# Patient Record
Sex: FEMALE | Race: BLACK OR AFRICAN AMERICAN | NOT HISPANIC OR LATINO | Employment: OTHER | URBAN - METROPOLITAN AREA
[De-identification: names, ages, dates, MRNs, and addresses within clinical notes are randomized per-mention and may not be internally consistent; named-entity substitution may affect disease eponyms.]

---

## 2022-02-28 ENCOUNTER — OFFICE VISIT (OUTPATIENT)
Dept: OPHTHALMOLOGY | Facility: CLINIC | Age: 62
End: 2022-02-28
Payer: MEDICAID

## 2022-02-28 DIAGNOSIS — H40.20X0 NARROW ANGLE GLAUCOMA OF RIGHT EYE: Primary | ICD-10-CM

## 2022-02-28 DIAGNOSIS — H40.20X0 NARROW ANGLE GLAUCOMA OF LEFT EYE: ICD-10-CM

## 2022-02-28 DIAGNOSIS — H25.11 NUCLEAR SCLEROSIS OF RIGHT EYE: ICD-10-CM

## 2022-02-28 PROCEDURE — 1159F MED LIST DOCD IN RCRD: CPT | Mod: CPTII,,, | Performed by: OPHTHALMOLOGY

## 2022-02-28 PROCEDURE — 99202 OFFICE O/P NEW SF 15 MIN: CPT | Mod: PBBFAC | Performed by: OPHTHALMOLOGY

## 2022-02-28 PROCEDURE — 1160F RVW MEDS BY RX/DR IN RCRD: CPT | Mod: CPTII,,, | Performed by: OPHTHALMOLOGY

## 2022-02-28 PROCEDURE — 1160F PR REVIEW ALL MEDS BY PRESCRIBER/CLIN PHARMACIST DOCUMENTED: ICD-10-PCS | Mod: CPTII,,, | Performed by: OPHTHALMOLOGY

## 2022-02-28 PROCEDURE — 1159F PR MEDICATION LIST DOCUMENTED IN MEDICAL RECORD: ICD-10-PCS | Mod: CPTII,,, | Performed by: OPHTHALMOLOGY

## 2022-02-28 PROCEDURE — 99999 PR PBB SHADOW E&M-NEW PATIENT-LVL II: CPT | Mod: PBBFAC,,, | Performed by: OPHTHALMOLOGY

## 2022-02-28 PROCEDURE — 99999 PR PBB SHADOW E&M-NEW PATIENT-LVL II: ICD-10-PCS | Mod: PBBFAC,,, | Performed by: OPHTHALMOLOGY

## 2022-02-28 PROCEDURE — 92004 COMPRE OPH EXAM NEW PT 1/>: CPT | Mod: S$PBB,,, | Performed by: OPHTHALMOLOGY

## 2022-02-28 PROCEDURE — 92020 GONIOSCOPY: CPT | Mod: PBBFAC | Performed by: OPHTHALMOLOGY

## 2022-02-28 PROCEDURE — 92020 GONIOSCOPY: CPT | Mod: S$PBB,,, | Performed by: OPHTHALMOLOGY

## 2022-02-28 PROCEDURE — 92020 PR SPECIAL EYE EVAL,GONIOSCOPY: ICD-10-PCS | Mod: S$PBB,,, | Performed by: OPHTHALMOLOGY

## 2022-02-28 PROCEDURE — 92004 PR EYE EXAM, NEW PATIENT,COMPREHESV: ICD-10-PCS | Mod: S$PBB,,, | Performed by: OPHTHALMOLOGY

## 2022-02-28 RX ORDER — BRIMONIDINE TARTRATE AND TIMOLOL MALEATE 2; 5 MG/ML; MG/ML
1 SOLUTION OPHTHALMIC 2 TIMES DAILY
Qty: 5 ML | Refills: 6 | Status: SHIPPED | OUTPATIENT
Start: 2022-02-28 | End: 2022-06-27 | Stop reason: SDUPTHER

## 2022-02-28 NOTE — PROGRESS NOTES
Subjective:       Patient ID: Yane Rodriguez is a 62 y.o. female.    Chief Complaint: Concerns About Ocular Health, Cataract, and Eye Problem    HPI     Pt here for cataract evaluation   Pt had Glaucoma sx OS about a year ago   Pain OS ever since her SX on and off   Pt has very blurred VA  No gtts    Last edited by Dorie Adrian on 2/28/2022  8:23 AM. (History)             Assessment:       1. Narrow angle glaucoma of right eye    2. Narrow angle glaucoma of left eye    3. Nuclear sclerosis of right eye        Plan:       Narrow angle glaucoma OD-Pt with elevated IOP & ON cupping. Pt not on any gtts at this time. IOP lowered OU today with Combigan & Azopt x 3.  Narrow angle glaucoma OS s/p trab per Dr Collins last yr-Pt gives a h/o possible angle closure episode with ? Laser PI x 2. Pt is s/p failed trab with elevated IOP & post synechiae (no view of lens & posterior segment).  Cataract OD- Not visually significant.         Start Combigan bid OU.  Refer to Dr Turcios for eval/tx OU.

## 2022-03-04 ENCOUNTER — TELEPHONE (OUTPATIENT)
Dept: OPHTHALMOLOGY | Facility: CLINIC | Age: 62
End: 2022-03-04
Payer: MEDICAID

## 2022-03-04 ENCOUNTER — OFFICE VISIT (OUTPATIENT)
Dept: OPHTHALMOLOGY | Facility: CLINIC | Age: 62
End: 2022-03-04
Payer: MEDICAID

## 2022-03-04 DIAGNOSIS — H40.2224 CHRONIC ANGLE-CLOSURE GLAUCOMA, INDETERMINATE STAGE, LEFT: Primary | ICD-10-CM

## 2022-03-04 DIAGNOSIS — H40.52X4: ICD-10-CM

## 2022-03-04 DIAGNOSIS — H40.1114 PRIMARY OPEN ANGLE GLAUCOMA (POAG) OF RIGHT EYE, INDETERMINATE STAGE: ICD-10-CM

## 2022-03-04 DIAGNOSIS — H21.522 PAS (PERIPH ANTERIOR SYNECHIAE), LEFT: ICD-10-CM

## 2022-03-04 DIAGNOSIS — H18.422 BAND KERATOPATHY OF LEFT EYE: ICD-10-CM

## 2022-03-04 DIAGNOSIS — H40.031 ANATOMICAL NARROW ANGLE, RIGHT EYE: ICD-10-CM

## 2022-03-04 DIAGNOSIS — H21.42: ICD-10-CM

## 2022-03-04 DIAGNOSIS — H20.12 CHRONIC IRITIS, LEFT EYE: ICD-10-CM

## 2022-03-04 PROCEDURE — 92014 COMPRE OPH EXAM EST PT 1/>: CPT | Mod: S$PBB,,, | Performed by: OPHTHALMOLOGY

## 2022-03-04 PROCEDURE — 76512 OPH US DX B-SCAN: CPT | Mod: 50,PBBFAC | Performed by: OPHTHALMOLOGY

## 2022-03-04 PROCEDURE — 99211 OFF/OP EST MAY X REQ PHY/QHP: CPT | Mod: PBBFAC | Performed by: OPHTHALMOLOGY

## 2022-03-04 PROCEDURE — 92020 GONIOSCOPY: CPT | Mod: PBBFAC | Performed by: OPHTHALMOLOGY

## 2022-03-04 PROCEDURE — 99999 PR PBB SHADOW E&M-EST. PATIENT-LVL I: CPT | Mod: PBBFAC,,, | Performed by: OPHTHALMOLOGY

## 2022-03-04 PROCEDURE — 92020 PR SPECIAL EYE EVAL,GONIOSCOPY: ICD-10-PCS | Mod: S$PBB,,, | Performed by: OPHTHALMOLOGY

## 2022-03-04 PROCEDURE — 92014 PR EYE EXAM, EST PATIENT,COMPREHESV: ICD-10-PCS | Mod: S$PBB,,, | Performed by: OPHTHALMOLOGY

## 2022-03-04 PROCEDURE — 99999 PR PBB SHADOW E&M-EST. PATIENT-LVL I: ICD-10-PCS | Mod: PBBFAC,,, | Performed by: OPHTHALMOLOGY

## 2022-03-04 PROCEDURE — 92020 GONIOSCOPY: CPT | Mod: S$PBB,,, | Performed by: OPHTHALMOLOGY

## 2022-03-04 PROCEDURE — 76512 B SCAN: ICD-10-PCS | Mod: 26,50,S$PBB, | Performed by: OPHTHALMOLOGY

## 2022-03-04 RX ORDER — DORZOLAMIDE HCL 20 MG/ML
1 SOLUTION/ DROPS OPHTHALMIC 3 TIMES DAILY
Qty: 10 ML | Refills: 6 | Status: SHIPPED | OUTPATIENT
Start: 2022-03-04 | End: 2022-06-27 | Stop reason: SDUPTHER

## 2022-03-04 NOTE — PROGRESS NOTES
HPI     DLS: 2/28/2022 With Dr. Carrillo    Pt here for High IOP Check per Dr. Carrillo;    H/O iritis and glaucoma OS    S/P several laser treatments and a trabeculectomy os feb 2021 - Dr Collins      Pt states that she did NOT have cataract surgery at same time     Meds;   Combigan BID OU - recently started with zackary - 2/28/2022     Last edited by Nona Turcios MD on 3/4/2022  9:51 AM. (History)            Assessment /Plan     For exam results, see Encounter Report.    Chronic angle-closure glaucoma, indeterminate stage, left  -     dorzolamide (TRUSOPT) 2 % ophthalmic solution; Place 1 drop into both eyes 3 (three) times daily.  Dispense: 10 mL; Refill: 6  -     Color Fundus Photography - OU - Both Eyes  -     Posterior Segment OCT Optic Nerve- Both eyes; Future  -     [place] Visual Field - OU - Extended - Both Eyes; Future    Primary open angle glaucoma (POAG) of right eye, indeterminate stage  -     dorzolamide (TRUSOPT) 2 % ophthalmic solution; Place 1 drop into both eyes 3 (three) times daily.  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 Laser PI's) followed by trab os - Dr Collins - feb 2021   Pt states she ws discharged fro clinic - but was to call when she wanted to schedule cataract surgery   Was seen by dr Wilkinson and referred over     2. POAG od    slightly narrow angle    CDR 0.8 w/ inf notch    T base 38/34 - off gtts    IOP better on combigan ou - 38/34--> 23/21   Photos - OD - 3/4/2022   + FmHx - mother     3. NS  od    NVS     4. Secluded pupil - no view at all    No H/O lens extraction - so lens is likely behind the secluded pupil    Cornea is so cloudy - that catarct surgery would be difficult or impossible     5. Chronic iritis os    Lots of KP's on the cornea   ?? If it was a uveitic glaucoma as initial problem     PLAN   B scan os - no obvious RD     ON photos od   Add dorzolamdie tid ou   Cont combigan   Schedule HVF od and OCT   Consider steroid gtts os   ? What the best approach to possibly re-habilitating the left eye might be - anterior - but cornea is VERY cloudy - or PPVx / lensectomy - but again not sure the view would be adequate     Needs glaucoma evaluation OD and try to stabilize the right eye    May benefit from laser PI in future OD     Try and get some records from Dr Vela office - that may shed some light on initial cause of glaucoma and on possible visual potential os - release signed

## 2022-03-15 ENCOUNTER — OFFICE VISIT (OUTPATIENT)
Dept: OPHTHALMOLOGY | Facility: CLINIC | Age: 62
End: 2022-03-15
Payer: MEDICAID

## 2022-03-15 ENCOUNTER — CLINICAL SUPPORT (OUTPATIENT)
Dept: OPHTHALMOLOGY | Facility: CLINIC | Age: 62
End: 2022-03-15
Payer: MEDICAID

## 2022-03-15 DIAGNOSIS — H40.1112 PRIMARY OPEN ANGLE GLAUCOMA (POAG) OF RIGHT EYE, MODERATE STAGE: ICD-10-CM

## 2022-03-15 DIAGNOSIS — H18.422 BAND KERATOPATHY OF LEFT EYE: ICD-10-CM

## 2022-03-15 DIAGNOSIS — H40.1114 PRIMARY OPEN ANGLE GLAUCOMA (POAG) OF RIGHT EYE, INDETERMINATE STAGE: ICD-10-CM

## 2022-03-15 DIAGNOSIS — H40.2224 CHRONIC ANGLE-CLOSURE GLAUCOMA, INDETERMINATE STAGE, LEFT: Primary | ICD-10-CM

## 2022-03-15 DIAGNOSIS — H40.2224 CHRONIC ANGLE-CLOSURE GLAUCOMA, INDETERMINATE STAGE, LEFT: ICD-10-CM

## 2022-03-15 DIAGNOSIS — H40.031 ANATOMICAL NARROW ANGLE, RIGHT EYE: ICD-10-CM

## 2022-03-15 DIAGNOSIS — H25.11 NUCLEAR SCLEROSIS OF RIGHT EYE: ICD-10-CM

## 2022-03-15 DIAGNOSIS — H21.522 PAS (PERIPH ANTERIOR SYNECHIAE), LEFT: ICD-10-CM

## 2022-03-15 DIAGNOSIS — H20.12 CHRONIC IRITIS, LEFT EYE: ICD-10-CM

## 2022-03-15 PROCEDURE — 92133 POSTERIOR SEGMENT OCT OPTIC NERVE(OCULAR COHERENCE TOMOGRAPHY) - OU - BOTH EYES: ICD-10-PCS | Mod: 26,S$PBB,, | Performed by: OPHTHALMOLOGY

## 2022-03-15 PROCEDURE — 99212 OFFICE O/P EST SF 10 MIN: CPT | Mod: PBBFAC | Performed by: OPHTHALMOLOGY

## 2022-03-15 PROCEDURE — 99999 PR PBB SHADOW E&M-EST. PATIENT-LVL II: CPT | Mod: PBBFAC,,, | Performed by: OPHTHALMOLOGY

## 2022-03-15 PROCEDURE — 1159F PR MEDICATION LIST DOCUMENTED IN MEDICAL RECORD: ICD-10-PCS | Mod: CPTII,,, | Performed by: OPHTHALMOLOGY

## 2022-03-15 PROCEDURE — 92012 PR EYE EXAM, EST PATIENT,INTERMED: ICD-10-PCS | Mod: S$PBB,,, | Performed by: OPHTHALMOLOGY

## 2022-03-15 PROCEDURE — 99999 PR PBB SHADOW E&M-EST. PATIENT-LVL II: ICD-10-PCS | Mod: PBBFAC,,, | Performed by: OPHTHALMOLOGY

## 2022-03-15 PROCEDURE — 92133 CPTRZD OPH DX IMG PST SGM ON: CPT | Mod: PBBFAC | Performed by: OPHTHALMOLOGY

## 2022-03-15 PROCEDURE — 1159F MED LIST DOCD IN RCRD: CPT | Mod: CPTII,,, | Performed by: OPHTHALMOLOGY

## 2022-03-15 PROCEDURE — 92012 INTRM OPH EXAM EST PATIENT: CPT | Mod: S$PBB,,, | Performed by: OPHTHALMOLOGY

## 2022-03-15 PROCEDURE — 1160F RVW MEDS BY RX/DR IN RCRD: CPT | Mod: CPTII,,, | Performed by: OPHTHALMOLOGY

## 2022-03-15 PROCEDURE — 1160F PR REVIEW ALL MEDS BY PRESCRIBER/CLIN PHARMACIST DOCUMENTED: ICD-10-PCS | Mod: CPTII,,, | Performed by: OPHTHALMOLOGY

## 2022-03-15 RX ORDER — LATANOPROST 50 UG/ML
1 SOLUTION/ DROPS OPHTHALMIC NIGHTLY
Qty: 2.5 ML | Refills: 12 | Status: SHIPPED | OUTPATIENT
Start: 2022-03-15 | End: 2023-02-27 | Stop reason: SDUPTHER

## 2022-03-15 RX ORDER — LEVOTHYROXINE SODIUM 125 UG/1
125 TABLET ORAL DAILY
COMMUNITY
Start: 2022-02-24

## 2022-03-15 NOTE — PROGRESS NOTES
HPI     Glaucoma      Additional comments: HVF and OCT review and pt states no changes since   last exam              Comments     DLS: 3/4/22    1. CACG OS  2. POAG OD  3. Secluded Pupil OS  4. Chronic Iritis OS  5. NS OD    MEDS:  Combigan BID OU  Dorzolamide TID OU          Last edited by Carlene Vaughn MA on 3/15/2022  3:34 PM. (History)            Assessment /Plan     For exam results, see Encounter Report.    Chronic angle-closure glaucoma, indeterminate stage, left    Primary open angle glaucoma (POAG) of right eye, moderate stage    Anatomical narrow angle, right eye    Pas (periph anterior synechiae), left    Band keratopathy of left eye    Chronic iritis, left eye    Nuclear sclerosis of right eye        1. 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 Laser PI's) followed by trab os - Dr Collins - feb 2021   Pt states she ws discharged from clinic - but was to call when she wanted to schedule cataract surgery   Was seen by dr Wilkinson and referred over     2. POAG od    + FmHx - mother    slightly narrow angle    CDR 0.8 w/ inf notch    T base 38/34 - off gtts    IOP better on combigan BID ou and dorzolamide TID OU - 38/34--> 23/21 -->24/16   Photos - OD - 3/4/2022   + FmHx - mother    OCT - RNFL od 0 dec IT (3/2022)   HVF - 24-2 ss od - SAD w/ 1 out of 4 fix points affected  // INS (3/2022)     3. NS od    NVS     4. Secluded pupil - no view at all    No H/O lens extraction - so lens is likely behind the secluded pupil    Cornea is so cloudy - that catarct surgery would be difficult or impossible     5. Chronic iritis os    Lots of KP's on the cornea   ?? If it was a uveitic glaucoma as initial problem     PLAN   B scan os - no obvious RD   ON photos od   Cont  dorzolamdie tid ou   Cont combigan ou   Schedule HVF od and OCT   Consider steroid gtts os   ? What the best approach to possibly re-habilitating the left eye might be - anterior - but cornea is VERY  cloudy - or PPVx / lensectomy - but again not sure the view would be adequate     Needs glaucoma evaluation OD and try to stabilize the right eye    May benefit from laser PI in future OD - but doubt occludedable  at this time - and this may be how the problems in the left eye started     ?? Consider a uveitic / iritis work up     Attempt to  get some records from Dr Vela office - that may shed some light on initial cause of glaucoma and on possible visual potential os - release signed 3/4/22

## 2022-04-26 ENCOUNTER — OFFICE VISIT (OUTPATIENT)
Dept: OPHTHALMOLOGY | Facility: CLINIC | Age: 62
End: 2022-04-26
Payer: MEDICAID

## 2022-04-26 DIAGNOSIS — H40.1112 PRIMARY OPEN ANGLE GLAUCOMA (POAG) OF RIGHT EYE, MODERATE STAGE: ICD-10-CM

## 2022-04-26 DIAGNOSIS — H18.422 BAND KERATOPATHY OF LEFT EYE: ICD-10-CM

## 2022-04-26 DIAGNOSIS — H21.42: ICD-10-CM

## 2022-04-26 DIAGNOSIS — H20.12 CHRONIC IRITIS, LEFT EYE: ICD-10-CM

## 2022-04-26 DIAGNOSIS — H40.031 ANATOMICAL NARROW ANGLE, RIGHT EYE: ICD-10-CM

## 2022-04-26 DIAGNOSIS — H40.52X4: ICD-10-CM

## 2022-04-26 DIAGNOSIS — H21.522 PAS (PERIPH ANTERIOR SYNECHIAE), LEFT: ICD-10-CM

## 2022-04-26 DIAGNOSIS — H25.11 NUCLEAR SCLEROSIS OF RIGHT EYE: ICD-10-CM

## 2022-04-26 DIAGNOSIS — H40.2224 CHRONIC ANGLE-CLOSURE GLAUCOMA, INDETERMINATE STAGE, LEFT: Primary | ICD-10-CM

## 2022-04-26 PROCEDURE — 1160F RVW MEDS BY RX/DR IN RCRD: CPT | Mod: CPTII,,, | Performed by: OPHTHALMOLOGY

## 2022-04-26 PROCEDURE — 99999 PR PBB SHADOW E&M-EST. PATIENT-LVL II: ICD-10-PCS | Mod: PBBFAC,,, | Performed by: OPHTHALMOLOGY

## 2022-04-26 PROCEDURE — 1159F MED LIST DOCD IN RCRD: CPT | Mod: CPTII,,, | Performed by: OPHTHALMOLOGY

## 2022-04-26 PROCEDURE — 1160F PR REVIEW ALL MEDS BY PRESCRIBER/CLIN PHARMACIST DOCUMENTED: ICD-10-PCS | Mod: CPTII,,, | Performed by: OPHTHALMOLOGY

## 2022-04-26 PROCEDURE — 92012 INTRM OPH EXAM EST PATIENT: CPT | Mod: S$PBB,,, | Performed by: OPHTHALMOLOGY

## 2022-04-26 PROCEDURE — 99212 OFFICE O/P EST SF 10 MIN: CPT | Mod: PBBFAC | Performed by: OPHTHALMOLOGY

## 2022-04-26 PROCEDURE — 92012 PR EYE EXAM, EST PATIENT,INTERMED: ICD-10-PCS | Mod: S$PBB,,, | Performed by: OPHTHALMOLOGY

## 2022-04-26 PROCEDURE — 99999 PR PBB SHADOW E&M-EST. PATIENT-LVL II: CPT | Mod: PBBFAC,,, | Performed by: OPHTHALMOLOGY

## 2022-04-26 PROCEDURE — 1159F PR MEDICATION LIST DOCUMENTED IN MEDICAL RECORD: ICD-10-PCS | Mod: CPTII,,, | Performed by: OPHTHALMOLOGY

## 2022-04-26 NOTE — PROGRESS NOTES
HPI     DLS: 3/15/2022    Pt here for IOP Check;  Pt states every now and then she would get a headache.     Meds;  Combigan BID OU   Dorzolamide TID OU  Latanoprost QHS OD ONLY    1. CACG OS   2. POAG OD   3. Secluded Pupil OS   4. Chronic Iritis OS   5. NS OD     Last edited by Nancy Daniel on 4/26/2022  1:44 PM. (History)            Assessment /Plan     For exam results, see Encounter Report.    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 Laser PI's) followed by trab os - Dr Collins - feb 2021   Pt states she ws discharged from clinic - but was to call when she wanted to schedule cataract surgery   Was seen by dr Wilkinson and referred over     2. POAG od    + FmHx - mother    slightly narrow angle - monitor   CDR 0.8 w/ inf notch    T base 38/34 - off gtts    IOP better on combigan BID ou and dorzolamide TID OU - 38/34--> 23/21 -->24/16   Photos - OD - 3/4/2022   + FmHx - mother    OCT - RNFL od 0 dec IT (3/2022)   HVF - 24-2 ss od - SAD w/ 1 out of 4 fix points affected  // INS (3/2022)         Ttoday  16/15      Tests today IOP check    3. NS od    NVS     4. Secluded pupil - no view at all    No H/O lens extraction - so lens is likely behind the secluded pupil    Cornea is so cloudy - that catarct surgery would be difficult or impossible     5. Chronic iritis os    Lots of KP's on the cornea   ?? If it was a uveitic glaucoma as initial problem     PLAN   B scan os - no obvious RD   ON photos od   Cont  dorzolamdie tid ou   Cont combigan ou   Cont latanoprost qhs OD  Schedule HVF od and OCT    Consider steroid gtts os   ? What the best approach to possibly re-habilitating the left eye might be - anterior - but cornea is VERY cloudy - or PPVx / lensectomy - but again not sure the view would be adequate     Needs glaucoma evaluation OD and try to stabilize the right eye    May benefit from laser PI in future OD - but doubt occludedable  at this time - and this may be how the problems in the left eye started     ?? Consider a uveitic / iritis work up     Received records from Dr Vela office -  shed some light on initial cause of glaucoma and on possible visual potential os - release signed 3/4/22    4/26/2022  IOP good od // vision good od   CSM    Do not feel that any surgical intervention of the left eye is likely to help    F/U 2 months - if still ok can spread visits out to 3 months

## 2022-06-27 ENCOUNTER — OFFICE VISIT (OUTPATIENT)
Dept: OPHTHALMOLOGY | Facility: CLINIC | Age: 62
End: 2022-06-27
Payer: MEDICAID

## 2022-06-27 DIAGNOSIS — H21.522 PAS (PERIPH ANTERIOR SYNECHIAE), LEFT: ICD-10-CM

## 2022-06-27 DIAGNOSIS — H40.031 ANATOMICAL NARROW ANGLE, RIGHT EYE: ICD-10-CM

## 2022-06-27 DIAGNOSIS — H40.2224 CHRONIC ANGLE-CLOSURE GLAUCOMA, INDETERMINATE STAGE, LEFT: Primary | ICD-10-CM

## 2022-06-27 DIAGNOSIS — H40.52X4: ICD-10-CM

## 2022-06-27 DIAGNOSIS — H40.1114 PRIMARY OPEN ANGLE GLAUCOMA (POAG) OF RIGHT EYE, INDETERMINATE STAGE: ICD-10-CM

## 2022-06-27 DIAGNOSIS — H21.42: ICD-10-CM

## 2022-06-27 DIAGNOSIS — H20.12 CHRONIC IRITIS, LEFT EYE: ICD-10-CM

## 2022-06-27 DIAGNOSIS — H40.1112 PRIMARY OPEN ANGLE GLAUCOMA (POAG) OF RIGHT EYE, MODERATE STAGE: ICD-10-CM

## 2022-06-27 DIAGNOSIS — H25.11 NUCLEAR SCLEROSIS OF RIGHT EYE: ICD-10-CM

## 2022-06-27 DIAGNOSIS — H18.422 BAND KERATOPATHY OF LEFT EYE: ICD-10-CM

## 2022-06-27 PROCEDURE — 1159F PR MEDICATION LIST DOCUMENTED IN MEDICAL RECORD: ICD-10-PCS | Mod: CPTII,,, | Performed by: OPHTHALMOLOGY

## 2022-06-27 PROCEDURE — 1159F MED LIST DOCD IN RCRD: CPT | Mod: CPTII,,, | Performed by: OPHTHALMOLOGY

## 2022-06-27 PROCEDURE — 1160F PR REVIEW ALL MEDS BY PRESCRIBER/CLIN PHARMACIST DOCUMENTED: ICD-10-PCS | Mod: CPTII,,, | Performed by: OPHTHALMOLOGY

## 2022-06-27 PROCEDURE — 99212 OFFICE O/P EST SF 10 MIN: CPT | Mod: PBBFAC | Performed by: OPHTHALMOLOGY

## 2022-06-27 PROCEDURE — 1160F RVW MEDS BY RX/DR IN RCRD: CPT | Mod: CPTII,,, | Performed by: OPHTHALMOLOGY

## 2022-06-27 PROCEDURE — 99999 PR PBB SHADOW E&M-EST. PATIENT-LVL II: ICD-10-PCS | Mod: PBBFAC,,, | Performed by: OPHTHALMOLOGY

## 2022-06-27 PROCEDURE — 92012 PR EYE EXAM, EST PATIENT,INTERMED: ICD-10-PCS | Mod: S$PBB,,, | Performed by: OPHTHALMOLOGY

## 2022-06-27 PROCEDURE — 99999 PR PBB SHADOW E&M-EST. PATIENT-LVL II: CPT | Mod: PBBFAC,,, | Performed by: OPHTHALMOLOGY

## 2022-06-27 PROCEDURE — 92012 INTRM OPH EXAM EST PATIENT: CPT | Mod: S$PBB,,, | Performed by: OPHTHALMOLOGY

## 2022-06-27 RX ORDER — DORZOLAMIDE HCL 20 MG/ML
1 SOLUTION/ DROPS OPHTHALMIC 3 TIMES DAILY
Qty: 10 ML | Refills: 12 | Status: SHIPPED | OUTPATIENT
Start: 2022-06-27 | End: 2023-02-27 | Stop reason: SDUPTHER

## 2022-06-27 RX ORDER — BRIMONIDINE TARTRATE AND TIMOLOL MALEATE 2; 5 MG/ML; MG/ML
1 SOLUTION OPHTHALMIC 2 TIMES DAILY
Qty: 5 ML | Refills: 12 | Status: SHIPPED | OUTPATIENT
Start: 2022-06-27 | End: 2023-02-27 | Stop reason: SDUPTHER

## 2022-06-27 NOTE — PROGRESS NOTES
HPI     DLS: 4/26/2022    Pt here for 2 Month Check;  Pt states she's been having some discomfort to her OS keeps constantly   tearing.     Meds:  Combigan BID OU   Dorzolamide TID OU   Latanoprost QHS OD ONLY     1. CACG OS   2. POAG OD   3. Secluded Pupil OS   4. Chronic Iritis OS   5. NS OD     Last edited by Nancy Daniel on 6/27/2022 10:21 AM. (History)            Assessment /Plan     For exam results, see Encounter Report.    Chronic angle-closure glaucoma, indeterminate stage, left  -     dorzolamide (TRUSOPT) 2 % ophthalmic solution; Place 1 drop into both eyes 3 (three) times daily.  Dispense: 10 mL; Refill: 12  -     brimonidine-timoloL (COMBIGAN) 0.2-0.5 % Drop; Place 1 drop into both eyes 2 (two) times a day.  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  Dispense: 10 mL; Refill: 12  -     brimonidine-timoloL (COMBIGAN) 0.2-0.5 % Drop; Place 1 drop into both eyes 2 (two) times a day.  Dispense: 5 mL; Refill: 12        1. 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 Laser PI's) followed by trab os - Dr Collins - feb 2021   Pt states she ws discharged from clinic - but was to call when she wanted to schedule cataract surgery   Was seen by dr Wilkinson and referred over     2. POAG od    + FmHx - mother    slightly narrow angle - monitor   CDR 0.8 w/ inf notch    T base 38/34 - off gtts    IOP better on combigan BID ou and dorzolamide TID OU - 38/34--> 23/21 -->24/16   Photos - OD - 3/4/2022   + FmHx -  mother    OCT - RNFL od 0 dec IT (3/2022)   HVF - 24-2 ss od - SAD w/ 1 out of 4 fix points affected  // INS (3/2022)         Ttoday  16/15      Tests today IOP check     3. NS od    NVS     4. Secluded pupil - no view at all    No H/O lens extraction - so lens is likely behind the secluded pupil    Cornea is so cloudy - that catarct surgery would be difficult or impossible     5. Chronic iritis os    Lots of KP's on the cornea   ?? 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 ?? APD os ( on reverse testing)     PLAN   B scan os - no obvious RD   ON photos od   Cont  dorzolamdie tid ou   Cont combigan ou   Cont latanoprost qhs OD    Needs glaucoma evaluation OD and try to stabilize the right eye    May benefit from laser PI in future OD - but doubt occludedable  at this time - and this may be how the problems in the left eye started     ?? Consider a uveitic / iritis work up     Received records from Dr Vela office -  shed some light on initial cause of glaucoma and on possible visual potential os -    4/26/2022  IOP good od // vision good od   CSM    Do not feel that any surgical intervention of the left eye is likely to help - but if does want to try would likely need a PKP , a reconstruction/re-creation of the pupil, CE and PC IOL with a cornea specialist - but the ON is likely very damaged - H/O elevated IOP for a long time and with 360 angle closure - ?? Refer to Dr hair prn // IOP is ok on gtts for now   Pt will think about it - for now not interested in seeing cornea doctor.     F/U 3 months - IOP check // gonio / - monitor for any iritis - may need laser PI od - will need close post laser  monitoring

## 2022-06-28 ENCOUNTER — TELEPHONE (OUTPATIENT)
Dept: OPHTHALMOLOGY | Facility: CLINIC | Age: 62
End: 2022-06-28
Payer: MEDICAID

## 2022-06-28 NOTE — TELEPHONE ENCOUNTER
KENNEDY CORBIN   Key: [id number] - PA Case ID: [id number]   Rx #: [id number]    PA  submitted for Combigan to [facility] via [facility]

## 2022-08-18 ENCOUNTER — HOSPITAL ENCOUNTER (EMERGENCY)
Facility: HOSPITAL | Age: 62
Discharge: HOME | End: 2022-08-18
Attending: EMERGENCY MEDICINE
Payer: MEDICAID

## 2022-08-18 VITALS
OXYGEN SATURATION: 99 % | BODY MASS INDEX: 45.08 KG/M2 | TEMPERATURE: 99 F | HEIGHT: 62 IN | HEART RATE: 75 BPM | DIASTOLIC BLOOD PRESSURE: 95 MMHG | WEIGHT: 245 LBS | RESPIRATION RATE: 18 BRPM | SYSTOLIC BLOOD PRESSURE: 193 MMHG

## 2022-08-18 DIAGNOSIS — K02.9 DENTAL CARIES: Primary | ICD-10-CM

## 2022-08-18 PROCEDURE — 25000003 PHARM REV CODE 250: Mod: ER | Performed by: EMERGENCY MEDICINE

## 2022-08-18 PROCEDURE — 99284 EMERGENCY DEPT VISIT MOD MDM: CPT | Mod: ER

## 2022-08-18 RX ORDER — TRAMADOL HYDROCHLORIDE 50 MG/1
50 TABLET ORAL EVERY 6 HOURS PRN
Qty: 12 TABLET | Refills: 0 | Status: SHIPPED | OUTPATIENT
Start: 2022-08-18 | End: 2022-08-21

## 2022-08-18 RX ORDER — TRAMADOL HYDROCHLORIDE 50 MG/1
50 TABLET ORAL
Status: COMPLETED | OUTPATIENT
Start: 2022-08-18 | End: 2022-08-18

## 2022-08-18 RX ORDER — CLINDAMYCIN HYDROCHLORIDE 150 MG/1
300 CAPSULE ORAL 4 TIMES DAILY
Qty: 56 CAPSULE | Refills: 0 | Status: SHIPPED | OUTPATIENT
Start: 2022-08-18 | End: 2022-08-25

## 2022-08-18 RX ORDER — CLINDAMYCIN HYDROCHLORIDE 150 MG/1
300 CAPSULE ORAL
Status: COMPLETED | OUTPATIENT
Start: 2022-08-18 | End: 2022-08-18

## 2022-08-18 RX ADMIN — TRAMADOL HYDROCHLORIDE 50 MG: 50 TABLET, COATED ORAL at 08:08

## 2022-08-18 RX ADMIN — CLINDAMYCIN HYDROCHLORIDE 300 MG: 150 CAPSULE ORAL at 08:08

## 2022-08-19 NOTE — ED PROVIDER NOTES
Encounter Date: 8/18/2022       History     Chief Complaint   Patient presents with    Dental Pain     Pain to right side of mouth for a few days      Patient presents with a chief complaint of dental pain.  Onset noted over the past few days.  This is localized to the upper R jaw.  Patient denies trismus.  Patient denies fever and swelling.          Review of patient's allergies indicates:  No Known Allergies  Past Medical History:   Diagnosis Date    Cataract     Glaucoma      Past Surgical History:   Procedure Laterality Date    TRABECULECTOMY Left 02/2021         Family History   Problem Relation Age of Onset    Glaucoma Mother     Amblyopia Neg Hx     Blindness Neg Hx     Cataracts Neg Hx     Diabetes Neg Hx     Macular degeneration Neg Hx     Retinal detachment Neg Hx     Strabismus Neg Hx      Social History     Tobacco Use    Smoking status: Never Smoker    Smokeless tobacco: Never Used   Substance Use Topics    Alcohol use: Not Currently    Drug use: Not Currently     Review of Systems   Constitutional: Negative for chills and fever.   HENT: Positive for dental problem. Negative for congestion and rhinorrhea.    Respiratory: Negative for cough and shortness of breath.    Cardiovascular: Negative for chest pain and palpitations.   Gastrointestinal: Negative for abdominal pain, diarrhea and vomiting.   Genitourinary: Negative for dysuria.   Skin: Negative for color change and rash.   Allergic/Immunologic: Negative for immunocompromised state.   Neurological: Negative for dizziness and light-headedness.   Hematological: Negative for adenopathy. Does not bruise/bleed easily.       Physical Exam     Initial Vitals [08/18/22 1927]   BP Pulse Resp Temp SpO2   (!) 193/95 75 16 98.9 °F (37.2 °C) 99 %      MAP       --         Physical Exam    Nursing note and vitals reviewed.  Constitutional: She appears well-developed and well-nourished. She is not diaphoretic. No distress.   HENT:   Head:  Normocephalic and atraumatic.   Mouth/Throat: Uvula is midline, oropharynx is clear and moist and mucous membranes are normal. Dental caries present.       Cardiovascular: Normal rate, regular rhythm, normal heart sounds and intact distal pulses.   Pulmonary/Chest: Breath sounds normal. No respiratory distress.     Neurological: She is alert and oriented to person, place, and time.   Skin: Skin is warm and dry.         ED Course   Procedures  Labs Reviewed - No data to display       Imaging Results    None          Medications   traMADoL tablet 50 mg (50 mg Oral Given 8/18/22 2005)   clindamycin capsule 300 mg (300 mg Oral Given 8/18/22 2005)     Medical Decision Making:   ED Management:  All findings were reviewed with the patient/family in detail.  I see no indication of an emergent process beyond that addressed during our encounter but have duly counseled the patient/family regarding the need for prompt follow-up as well as the indications that should prompt immediate return to the emergency room should new or worrisome developments occur.  The patient has additionally been provided with printed information regarding diagnosis as well as instructions regarding follow up and any medications intended to manage the patient's aforementioned conditions.  The patient/family communicates understanding of all this information and all remaining questions and concerns were addressed at this time.                        Clinical Impression:   Final diagnoses:  [K02.9] Dental caries (Primary)          ED Disposition Condition    Discharge Stable        ED Prescriptions     Medication Sig Dispense Start Date End Date Auth. Provider    clindamycin (CLEOCIN) 150 MG capsule Take 2 capsules (300 mg total) by mouth 4 (four) times daily. for 7 days 56 capsule 8/18/2022 8/25/2022 Madi Rocha MD    traMADoL (ULTRAM) 50 mg tablet Take 1 tablet (50 mg total) by mouth every 6 (six) hours as needed for Pain. 12 tablet 8/18/2022  8/21/2022 Madi Rocha MD        Follow-up Information     Follow up With Specialties Details Why Contact Info    PCP  Schedule an appointment as soon as possible for a visit  for reassessment     [facility] Emergency Dept Emergency Medicine Go to  As needed, If symptoms worsen [address] [facility] [address] [phone]           Madi Rocha MD  08/19/22 [id number]

## 2022-10-18 ENCOUNTER — OFFICE VISIT (OUTPATIENT)
Dept: OPHTHALMOLOGY | Facility: CLINIC | Age: 62
End: 2022-10-18
Payer: MEDICAID

## 2022-10-18 DIAGNOSIS — H40.1112 PRIMARY OPEN ANGLE GLAUCOMA (POAG) OF RIGHT EYE, MODERATE STAGE: ICD-10-CM

## 2022-10-18 DIAGNOSIS — H40.031 ANATOMICAL NARROW ANGLE, RIGHT EYE: ICD-10-CM

## 2022-10-18 DIAGNOSIS — H20.12 CHRONIC IRITIS, LEFT EYE: ICD-10-CM

## 2022-10-18 DIAGNOSIS — H21.522 PAS (PERIPH ANTERIOR SYNECHIAE), LEFT: ICD-10-CM

## 2022-10-18 DIAGNOSIS — H25.11 NUCLEAR SCLEROSIS OF RIGHT EYE: ICD-10-CM

## 2022-10-18 DIAGNOSIS — H18.422 BAND KERATOPATHY OF LEFT EYE: ICD-10-CM

## 2022-10-18 DIAGNOSIS — H40.2224 CHRONIC ANGLE-CLOSURE GLAUCOMA, INDETERMINATE STAGE, LEFT: Primary | ICD-10-CM

## 2022-10-18 PROCEDURE — 92020 GONIOSCOPY: CPT | Mod: PBBFAC | Performed by: OPHTHALMOLOGY

## 2022-10-18 PROCEDURE — 1159F MED LIST DOCD IN RCRD: CPT | Mod: CPTII,,, | Performed by: OPHTHALMOLOGY

## 2022-10-18 PROCEDURE — 99999 PR PBB SHADOW E&M-EST. PATIENT-LVL II: CPT | Mod: PBBFAC,,, | Performed by: OPHTHALMOLOGY

## 2022-10-18 PROCEDURE — 1160F PR REVIEW ALL MEDS BY PRESCRIBER/CLIN PHARMACIST DOCUMENTED: ICD-10-PCS | Mod: CPTII,,, | Performed by: OPHTHALMOLOGY

## 2022-10-18 PROCEDURE — 92020 GONIOSCOPY: CPT | Mod: S$PBB,,, | Performed by: OPHTHALMOLOGY

## 2022-10-18 PROCEDURE — 99212 OFFICE O/P EST SF 10 MIN: CPT | Mod: PBBFAC | Performed by: OPHTHALMOLOGY

## 2022-10-18 PROCEDURE — 1159F PR MEDICATION LIST DOCUMENTED IN MEDICAL RECORD: ICD-10-PCS | Mod: CPTII,,, | Performed by: OPHTHALMOLOGY

## 2022-10-18 PROCEDURE — 92012 INTRM OPH EXAM EST PATIENT: CPT | Mod: S$PBB,,, | Performed by: OPHTHALMOLOGY

## 2022-10-18 PROCEDURE — 92012 PR EYE EXAM, EST PATIENT,INTERMED: ICD-10-PCS | Mod: S$PBB,,, | Performed by: OPHTHALMOLOGY

## 2022-10-18 PROCEDURE — 99999 PR PBB SHADOW E&M-EST. PATIENT-LVL II: ICD-10-PCS | Mod: PBBFAC,,, | Performed by: OPHTHALMOLOGY

## 2022-10-18 PROCEDURE — 1160F RVW MEDS BY RX/DR IN RCRD: CPT | Mod: CPTII,,, | Performed by: OPHTHALMOLOGY

## 2022-10-18 PROCEDURE — 92020 PR SPECIAL EYE EVAL,GONIOSCOPY: ICD-10-PCS | Mod: S$PBB,,, | Performed by: OPHTHALMOLOGY

## 2022-10-18 NOTE — PROGRESS NOTES
HPI     Glaucoma            Comments: 3 month ck and pt states no changes since last exam           Comments    DLS: 6/27/22    1. CACG OS  2. POAG OD  3. Secluded Pupil OS  4. Chronic Iritis OS  5. NS OD  6. ? APD OS    MEDS:  Combigan BID OU  Dorzolamide TID OU  Latanoprost QHS OD          Last edited by Carlene Vaughn MA on 10/18/2022 11:45 AM.              Assessment /Plan     For exam results, see Encounter Report.    Chronic angle-closure glaucoma, indeterminate stage, left    Primary open angle glaucoma (POAG) of right eye, moderate stage    Anatomical narrow angle, right eye    Pas (periph anterior synechiae), left    Band keratopathy of left eye    Chronic iritis, left eye    Nuclear sclerosis of right eye      1. 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 Laser PI's) followed by trab os - Dr Collins - feb 2021   Pt states she ws discharged from clinic - but was to call when she wanted to schedule cataract surgery   Was seen by dr Wilkinson and referred over     2. POAG od    + FmHx - mother    slightly narrow angle - monitor   CDR 0.8 w/ inf notch    T base 38/34 - off gtts    IOP better on combigan BID ou and dorzolamide TID OU - 38/34--> 23/21 -->24/16   Photos - OD - 3/4/2022   + FmHx - mother    OCT - RNFL od 0 dec IT (3/2022)   HVF - 24-2 ss od - SAD w/ 1 out of 4 fix points affected  // INS (3/2022)         Ttoday  11//40      Tests today IOP check gonio    3. NS od    NVS     4. Secluded pupil - no view at all    No H/O lens extraction - so lens is likely behind the secluded pupil    Cornea is so cloudy - that catarct surgery would be difficult or impossible     5. Chronic iritis os    Lots of KP's on the cornea   ?? 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 ?? APD os ( on reverse testing)     PLAN   B scan os - no obvious RD   ON photos od   Cont  dorzolamdie tid ou   Cont combigan ou   Cont latanoprost qhs OD    Needs glaucoma evaluation OD  and try to stabilize the right eye    May benefit from laser PI in future OD - but doubt occludedable  at this time - and this may be how the problems in the left eye started     ?? Consider a uveitic / iritis work up     Received records from Dr Vela office -  shed some light on initial cause of glaucoma and on possible visual potential os -    4/26/2022  IOP good od // vision good od   CSM    Do not feel that any surgical intervention of the left eye is likely to help - but if does want to try would likely need a PKP , a reconstruction/re-creation of the pupil, CE and PC IOL with a cornea specialist - but the ON is likely very damaged - H/O elevated IOP for a long time and with 360 angle closure - ?? Refer to Dr hair prn // IOP is ok on gtts for now   Pt will think about it - for now not interested in seeing cornea doctor.     Goal os is comfort - consider RB thorazine and CB destruction prn - this eye has had such a high IOP for so long that the ON will be severely damaged    Goal od is to control IOP and prevent further ON and VF loss   Consider laser PI - karissa if IOP goes up   Consider lensectomy od - to try and open the angle - no real cataract to speak of - VA is 20/20     F/U 3-4 months with HVF od only and OCT - keep dilation to a minimum

## 2023-02-26 NOTE — PROGRESS NOTES
HPI    DLS: 10/18/2022    Pt here for HVF review/OCT;  Pt states at times she would get pain to her OS that travels to her head.    Meds;  Combigan BID OU   Dorzolamide TID OU   Latanoprost QHS OD    1. CACG OS   2. POAG OD   3. Secluded Pupil OS   4. Chronic Iritis OS   5. NS OD   6. ? APD OS     Last edited by Nancy Daniel on 2/27/2023 11:39 AM.            Assessment /Plan     For exam results, see Encounter Report.    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 Laser PI's) followed by trab os - Dr Collins - feb 2021   Pt states she ws discharged from clinic - but was to call when she wanted to schedule cataract surgery   Was seen by dr Wilkinson and referred over       2.  Glaucoma (type and duration)    POAG od // chronic angle claosuer os with secluded pupil    First HVF   3/2022   First photos   3/2022   Treatment / Drops started   prior to starting at [facility] -            Family history    + mother         Glaucoma meds  combigan ou / dorzolamide ou / latanoprost od         H/O adverse rxn to glaucoma drops    none        LASERS    s/p attempt at laser PI os 2/2012        GLAUCOMA SURGERIES    none        OTHER EYE SURGERIES    none        CDR    0.8 w/ inf notch od // no view os         Tbase    38/34        Tmax    38/34            Ttarget    ? About 16-18            HVF    2 test 2022 to  2023 - SAD - involves fixation od // EXT  os        Gonio    +2-3 // 360 PAS        CCT    537/679        OCT    2 test 2022 to 2023 - RNFL - dec G/TI, bord TS/T (?prog)  od // no view os        Disc  photos    3/2022    - Ttoday    16/30  - Test done today  IOP // HVF // OCT         Ttoday  16/30      Tests today IOP // HVF od / OCT od     3. NS od    NVS     4. Secluded pupil - no view at all    No H/O lens extraction - so lens is likely behind the secluded pupil    Cornea is so cloudy - that catarct surgery would be difficult or impossible     5. Chronic iritis os    Lots of KP's on the cornea   ?? 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 ?? APD os ( on reverse testing)     PLAN   B scan os - no obvious RD   ON photos od   Cont  dorzolamdie tid ou   Cont combigan ou   Cont latanoprost qhs OD    Needs glaucoma evaluation OD and try to stabilize the right eye    May benefit from laser PI in future OD - but doubt occludedable  at this time - and this may be how the problems in the left eye started     ?? Consider a uveitic / iritis work up     Received records from Dr Vela office -  shed some light on initial cause of glaucoma and on possible visual potential os - was in angle closure and things went from bad to worse after that - ?? Chronic iritis as well     Do not feel that any surgical intervention of the left eye is likely to help - ++ high IOP for > 1 year and pupil is completely secluded and there is an apd     Goal os is comfort - consider RB thorazine and CB destruction prn - this eye has had such a high IOP for so long that the ON will be severely damaged    Goal od is to control IOP and prevent further ON and VF loss   Consider laser PI - karissa if IOP goes up   Consider lensectomy od - to try and open the angle - no real cataract to speak of - VA is 20/20     F/U 3-4 months with IOP and gonio - keep dilation to a minimum

## 2023-02-27 ENCOUNTER — OFFICE VISIT (OUTPATIENT)
Dept: OPHTHALMOLOGY | Facility: CLINIC | Age: 63
End: 2023-02-27
Payer: MEDICAID

## 2023-02-27 ENCOUNTER — CLINICAL SUPPORT (OUTPATIENT)
Dept: OPHTHALMOLOGY | Facility: CLINIC | Age: 63
End: 2023-02-27
Payer: MEDICAID

## 2023-02-27 DIAGNOSIS — H21.42: ICD-10-CM

## 2023-02-27 DIAGNOSIS — H40.2224 CHRONIC ANGLE-CLOSURE GLAUCOMA, INDETERMINATE STAGE, LEFT: Primary | ICD-10-CM

## 2023-02-27 DIAGNOSIS — H20.12 CHRONIC IRITIS, LEFT EYE: ICD-10-CM

## 2023-02-27 DIAGNOSIS — H21.522 PAS (PERIPH ANTERIOR SYNECHIAE), LEFT: ICD-10-CM

## 2023-02-27 DIAGNOSIS — H18.422 BAND KERATOPATHY OF LEFT EYE: ICD-10-CM

## 2023-02-27 DIAGNOSIS — H25.11 NUCLEAR SCLEROSIS OF RIGHT EYE: ICD-10-CM

## 2023-02-27 DIAGNOSIS — H40.1112 PRIMARY OPEN ANGLE GLAUCOMA (POAG) OF RIGHT EYE, MODERATE STAGE: ICD-10-CM

## 2023-02-27 DIAGNOSIS — H40.52X4: ICD-10-CM

## 2023-02-27 DIAGNOSIS — H40.031 ANATOMICAL NARROW ANGLE, RIGHT EYE: ICD-10-CM

## 2023-02-27 DIAGNOSIS — H40.1114 PRIMARY OPEN ANGLE GLAUCOMA (POAG) OF RIGHT EYE, INDETERMINATE STAGE: ICD-10-CM

## 2023-02-27 PROCEDURE — 1159F MED LIST DOCD IN RCRD: CPT | Mod: CPTII,,, | Performed by: OPHTHALMOLOGY

## 2023-02-27 PROCEDURE — 1159F PR MEDICATION LIST DOCUMENTED IN MEDICAL RECORD: ICD-10-PCS | Mod: CPTII,,, | Performed by: OPHTHALMOLOGY

## 2023-02-27 PROCEDURE — 99999 PR PBB SHADOW E&M-EST. PATIENT-LVL II: ICD-10-PCS | Mod: PBBFAC,,, | Performed by: OPHTHALMOLOGY

## 2023-02-27 PROCEDURE — 1160F PR REVIEW ALL MEDS BY PRESCRIBER/CLIN PHARMACIST DOCUMENTED: ICD-10-PCS | Mod: CPTII,,, | Performed by: OPHTHALMOLOGY

## 2023-02-27 PROCEDURE — 4010F PR ACE/ARB THEARPY RXD/TAKEN: ICD-10-PCS | Mod: CPTII,,, | Performed by: OPHTHALMOLOGY

## 2023-02-27 PROCEDURE — 92014 COMPRE OPH EXAM EST PT 1/>: CPT | Mod: S$PBB,,, | Performed by: OPHTHALMOLOGY

## 2023-02-27 PROCEDURE — 92083 HUMPHREY VISUAL FIELD - OU - BOTH EYES: ICD-10-PCS | Mod: 26,S$PBB,, | Performed by: OPHTHALMOLOGY

## 2023-02-27 PROCEDURE — 99999 PR PBB SHADOW E&M-EST. PATIENT-LVL II: CPT | Mod: PBBFAC,,, | Performed by: OPHTHALMOLOGY

## 2023-02-27 PROCEDURE — 92133 POSTERIOR SEGMENT OCT OPTIC NERVE(OCULAR COHERENCE TOMOGRAPHY) - OU - BOTH EYES: ICD-10-PCS | Mod: 26,S$PBB,, | Performed by: OPHTHALMOLOGY

## 2023-02-27 PROCEDURE — 92083 EXTENDED VISUAL FIELD XM: CPT | Mod: PBBFAC | Performed by: OPHTHALMOLOGY

## 2023-02-27 PROCEDURE — 92133 CPTRZD OPH DX IMG PST SGM ON: CPT | Mod: PBBFAC | Performed by: OPHTHALMOLOGY

## 2023-02-27 PROCEDURE — 99212 OFFICE O/P EST SF 10 MIN: CPT | Mod: PBBFAC | Performed by: OPHTHALMOLOGY

## 2023-02-27 PROCEDURE — 92014 PR EYE EXAM, EST PATIENT,COMPREHESV: ICD-10-PCS | Mod: S$PBB,,, | Performed by: OPHTHALMOLOGY

## 2023-02-27 PROCEDURE — 4010F ACE/ARB THERAPY RXD/TAKEN: CPT | Mod: CPTII,,, | Performed by: OPHTHALMOLOGY

## 2023-02-27 PROCEDURE — 1160F RVW MEDS BY RX/DR IN RCRD: CPT | Mod: CPTII,,, | Performed by: OPHTHALMOLOGY

## 2023-02-27 RX ORDER — LATANOPROST 50 UG/ML
1 SOLUTION/ DROPS OPHTHALMIC NIGHTLY
Qty: 2.5 ML | Refills: 12 | Status: SHIPPED | OUTPATIENT
Start: 2023-02-27 | End: 2023-06-09 | Stop reason: SDUPTHER

## 2023-02-27 RX ORDER — BRIMONIDINE TARTRATE AND TIMOLOL MALEATE 2; 5 MG/ML; MG/ML
1 SOLUTION OPHTHALMIC 2 TIMES DAILY
Qty: 5 ML | Refills: 12 | Status: SHIPPED | OUTPATIENT
Start: 2023-02-27 | End: 2023-04-10 | Stop reason: CLARIF

## 2023-02-27 RX ORDER — DORZOLAMIDE HCL 20 MG/ML
1 SOLUTION/ DROPS OPHTHALMIC 3 TIMES DAILY
Qty: 10 ML | Refills: 12 | Status: SHIPPED | OUTPATIENT
Start: 2023-02-27

## 2023-04-10 DIAGNOSIS — H40.1114 PRIMARY OPEN ANGLE GLAUCOMA (POAG) OF RIGHT EYE, INDETERMINATE STAGE: ICD-10-CM

## 2023-04-10 DIAGNOSIS — H40.2224 CHRONIC ANGLE-CLOSURE GLAUCOMA, INDETERMINATE STAGE, LEFT: ICD-10-CM

## 2023-04-10 RX ORDER — BRIMONIDINE TARTRATE, TIMOLOL MALEATE 2; 5 MG/ML; MG/ML
1 SOLUTION/ DROPS OPHTHALMIC 2 TIMES DAILY
Qty: 5 ML | Refills: 12 | Status: SHIPPED | OUTPATIENT
Start: 2023-04-10

## 2023-04-10 RX ORDER — BRIMONIDINE TARTRATE AND TIMOLOL MALEATE 2; 5 MG/ML; MG/ML
1 SOLUTION OPHTHALMIC 2 TIMES DAILY
COMMUNITY
Start: 2023-04-10 | End: 2023-04-10 | Stop reason: SDUPTHER

## 2023-06-05 NOTE — PROGRESS NOTES
HPI    Dls: 02/27/2023 Dr. Turcios     Pt her for 4 month iop check     Pt states she still gets pain in her OS and will take ibuprofen to relieve   the pain.   No Changes in VA  Compliant with gtts     1. CACG OS   2. POAG OD   3. Secluded Pupil OS   4. Chronic Iritis OS   5. NS OD   6. ? APD OS      Eye meds:   Combigan Bid OU   Dorzolomide TID Ou   Latanaprost qhs OD      Last edited by Nona Turcios MD on 6/9/2023 10:07 AM.            Assessment /Plan     For exam results, see Encounter Report.    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 Laser PI's) followed by trab os - Dr Collins - feb 2021   Pt states she ws discharged from clinic - but was to call when she wanted to schedule cataract surgery   Was seen by dr Wilkinson and referred over       2.  Glaucoma (type and duration)    POAG od // chronic angle claosuer os with secluded pupil    First HVF   3/2022   First photos   3/2022   Treatment / Drops started   prior to starting at [facility] -            Family history    + mother         Glaucoma meds  combigan ou / dorzolamide ou / latanoprost od         H/O adverse rxn to glaucoma drops    none        LASERS    s/p attempt at laser PI os 2/2012        GLAUCOMA SURGERIES    none        OTHER EYE SURGERIES    none        CDR    0.8 w/ inf notch od // no view os         Tbase    38/34        Tmax    38/34            Ttarget    ? About 16-18            HVF    2 test 2022 to  2023 - SAD - involves fixation od // EXT  os        Gonio    +2-3 // 360 PAS        CCT    537/679        OCT     2 test 2022 to 2023 - RNFL - dec G/TI, bord TS/T (?prog)  od // no view os        Disc photos    3/2022    - Ttoday   15/38  - Test done today  IOP //gonio        Ttoday  16/30      Tests today IOP // HVF od / OCT od     3. NS od    NVS     4. Secluded pupil - no view at all    No H/O lens extraction - so lens is likely behind the secluded pupil    Cornea is so cloudy - that catarct surgery would be difficult or impossible     5. Chronic iritis os    Lots of KP's on the cornea   ?? 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 ?? APD os ( on reverse testing)     PLAN   B scan os - no obvious RD   ON photos od   Cont  dorzolamdie tid ou   Cont combigan ou   Cont latanoprost qhs OD  Pt C/O pain os - Trial of atropine 1 x day os - told to NEVER put iit in the right eye as may cause angle closure   May need CG destruction with  RB thorazine and/or enucleation for pain     Needs glaucoma evaluation OD and try to stabilize the right eye    May benefit from laser PI in future OD - but doubt occludedable  at this time - and this may be how the problems in the left eye started     Received records from Dr Vela office -  shed some light on initial cause of glaucoma and on possible visual potential os - was in angle closure and things went from bad to worse after that - ?? Chronic iritis as well     Do not feel that any surgical intervention of the left eye is likely to help - ++ high IOP for > 1 year and pupil is completely secluded and there is an apd     Goal os is comfort - consider RB thorazine and CB destruction prn - this eye has had such a high IOP for so long that the ON will be severely damaged    Goal od is to control IOP and prevent further ON and VF loss   Consider laser PI - karissa if IOP goes up   Consider lensectomy od - to try and open the angle - no real cataract to speak of - VA is 20/20     F/U 3-4 months with IOP  and AR/MR/BAT od

## 2023-06-09 ENCOUNTER — OFFICE VISIT (OUTPATIENT)
Dept: OPHTHALMOLOGY | Facility: CLINIC | Age: 63
End: 2023-06-09
Payer: MEDICAID

## 2023-06-09 DIAGNOSIS — H40.52X4: ICD-10-CM

## 2023-06-09 DIAGNOSIS — H18.422 BAND KERATOPATHY OF LEFT EYE: ICD-10-CM

## 2023-06-09 DIAGNOSIS — H20.12 CHRONIC IRITIS, LEFT EYE: ICD-10-CM

## 2023-06-09 DIAGNOSIS — H21.522 PAS (PERIPH ANTERIOR SYNECHIAE), LEFT: ICD-10-CM

## 2023-06-09 DIAGNOSIS — H21.42: ICD-10-CM

## 2023-06-09 DIAGNOSIS — H40.031 ANATOMICAL NARROW ANGLE, RIGHT EYE: ICD-10-CM

## 2023-06-09 DIAGNOSIS — H40.2224 CHRONIC ANGLE-CLOSURE GLAUCOMA, INDETERMINATE STAGE, LEFT: Primary | ICD-10-CM

## 2023-06-09 DIAGNOSIS — H40.1112 PRIMARY OPEN ANGLE GLAUCOMA (POAG) OF RIGHT EYE, MODERATE STAGE: ICD-10-CM

## 2023-06-09 DIAGNOSIS — H25.11 NUCLEAR SCLEROSIS OF RIGHT EYE: ICD-10-CM

## 2023-06-09 PROCEDURE — 1160F RVW MEDS BY RX/DR IN RCRD: CPT | Mod: CPTII,,, | Performed by: OPHTHALMOLOGY

## 2023-06-09 PROCEDURE — 99214 OFFICE O/P EST MOD 30 MIN: CPT | Mod: S$PBB,,, | Performed by: OPHTHALMOLOGY

## 2023-06-09 PROCEDURE — 1159F PR MEDICATION LIST DOCUMENTED IN MEDICAL RECORD: ICD-10-PCS | Mod: CPTII,,, | Performed by: OPHTHALMOLOGY

## 2023-06-09 PROCEDURE — 99214 PR OFFICE/OUTPT VISIT, EST, LEVL IV, 30-39 MIN: ICD-10-PCS | Mod: S$PBB,,, | Performed by: OPHTHALMOLOGY

## 2023-06-09 PROCEDURE — 4010F PR ACE/ARB THEARPY RXD/TAKEN: ICD-10-PCS | Mod: CPTII,,, | Performed by: OPHTHALMOLOGY

## 2023-06-09 PROCEDURE — 1159F MED LIST DOCD IN RCRD: CPT | Mod: CPTII,,, | Performed by: OPHTHALMOLOGY

## 2023-06-09 PROCEDURE — 99999 PR PBB SHADOW E&M-EST. PATIENT-LVL II: ICD-10-PCS | Mod: PBBFAC,,, | Performed by: OPHTHALMOLOGY

## 2023-06-09 PROCEDURE — 99212 OFFICE O/P EST SF 10 MIN: CPT | Mod: PBBFAC | Performed by: OPHTHALMOLOGY

## 2023-06-09 PROCEDURE — 99999 PR PBB SHADOW E&M-EST. PATIENT-LVL II: CPT | Mod: PBBFAC,,, | Performed by: OPHTHALMOLOGY

## 2023-06-09 PROCEDURE — 4010F ACE/ARB THERAPY RXD/TAKEN: CPT | Mod: CPTII,,, | Performed by: OPHTHALMOLOGY

## 2023-06-09 PROCEDURE — 1160F PR REVIEW ALL MEDS BY PRESCRIBER/CLIN PHARMACIST DOCUMENTED: ICD-10-PCS | Mod: CPTII,,, | Performed by: OPHTHALMOLOGY

## 2023-06-09 RX ORDER — ATROPINE SULFATE 10 MG/ML
1 SOLUTION/ DROPS OPHTHALMIC DAILY
Qty: 5 ML | Refills: 1 | Status: SHIPPED | OUTPATIENT
Start: 2023-06-09 | End: 2023-06-16 | Stop reason: SDUPTHER

## 2023-06-09 RX ORDER — LATANOPROST 50 UG/ML
1 SOLUTION/ DROPS OPHTHALMIC NIGHTLY
Qty: 2.5 ML | Refills: 12 | Status: SHIPPED | OUTPATIENT
Start: 2023-06-09

## 2023-06-16 DIAGNOSIS — H21.42: ICD-10-CM

## 2023-06-16 DIAGNOSIS — H40.52X4: ICD-10-CM

## 2023-06-16 DIAGNOSIS — H20.12 CHRONIC IRITIS, LEFT EYE: ICD-10-CM

## 2023-06-16 RX ORDER — ATROPINE SULFATE 10 MG/ML
1 SOLUTION/ DROPS OPHTHALMIC DAILY
Qty: 5 ML | Refills: 1 | Status: SHIPPED | OUTPATIENT
Start: 2023-06-16

## 2023-06-22 ENCOUNTER — TELEPHONE (OUTPATIENT)
Dept: OPHTHALMOLOGY | Facility: CLINIC | Age: 63
End: 2023-06-22
Payer: MEDICAID

## 2023-06-22 NOTE — TELEPHONE ENCOUNTER
----- Message from Carlene Vaughn MA sent at 6/22/2023 10:51 AM CDT -----  Regarding: FW: Medication Advice  Contact: Pt    ----- Message -----  From: Sade Nicholson  Sent: 6/22/2023  10:44 AM CDT  To: [facility] Staff  Subject: Medication Advice                                Pt is calling in regards to eye drop medication. Pt stated the pharmacy don't have it and she can't find a pharmacy that have it in stock. Please adv pt      Medication:atropine 1% (ISOPTO ATROPINE) 1 % Drop 5 mL       Confirmed contact below:   Contact Name:Yane Rodriguez  Phone Number: [phone]

## 2023-06-22 NOTE — TELEPHONE ENCOUNTER
reviewed and states that if pt is not having pain or discomfort then she does not have to get drops. However, if eye starts to bother her then pt can try Cyclogyl or Homatropine.  Pt understood and will call back if she needs prescription.